# Patient Record
Sex: FEMALE | Race: WHITE | NOT HISPANIC OR LATINO | Employment: OTHER | URBAN - METROPOLITAN AREA
[De-identification: names, ages, dates, MRNs, and addresses within clinical notes are randomized per-mention and may not be internally consistent; named-entity substitution may affect disease eponyms.]

---

## 2019-02-27 RX ORDER — OMEPRAZOLE 40 MG/1
40 CAPSULE, DELAYED RELEASE ORAL AS NEEDED
COMMUNITY

## 2019-02-27 RX ORDER — MEDROXYPROGESTERONE ACETATE 2.5 MG/1
2.5 TABLET ORAL 3 TIMES WEEKLY
COMMUNITY

## 2019-02-27 RX ORDER — ASPIRIN 81 MG/1
81 TABLET ORAL DAILY
COMMUNITY

## 2019-02-28 ENCOUNTER — OFFICE VISIT (OUTPATIENT)
Dept: NEUROSURGERY | Facility: CLINIC | Age: 69
End: 2019-02-28

## 2019-02-28 VITALS
HEIGHT: 68 IN | WEIGHT: 114 LBS | SYSTOLIC BLOOD PRESSURE: 120 MMHG | BODY MASS INDEX: 17.28 KG/M2 | DIASTOLIC BLOOD PRESSURE: 82 MMHG | RESPIRATION RATE: 16 BRPM

## 2019-02-28 DIAGNOSIS — I65.21 STENOSIS OF RIGHT CAROTID ARTERY: Primary | ICD-10-CM

## 2019-02-28 PROCEDURE — 99203 OFFICE O/P NEW LOW 30 MIN: CPT | Performed by: NEUROLOGICAL SURGERY

## 2019-02-28 RX ORDER — CLOPIDOGREL BISULFATE 75 MG/1
75 TABLET ORAL DAILY
Qty: 30 TABLET | Refills: 5 | Status: SHIPPED | OUTPATIENT
Start: 2019-02-28 | End: 2019-09-09

## 2019-02-28 RX ORDER — ATORVASTATIN CALCIUM 20 MG/1
20 TABLET, FILM COATED ORAL DAILY
COMMUNITY
Start: 2019-02-25

## 2019-02-28 NOTE — PROGRESS NOTES
NAME: CYNTHIA BLOCH   DOS: 2019  : 1950  PCP: Emeterio Duran MD    Chief Complaint: Incidental cervical bruit  Chief Complaint   Patient presents with   • Carotid Stenosis       History of Present Illness:  68 y.o. female     This is a 68-year-old female with a family history of paternal heart attack at a young age she denies any strokes and has a mother with asymptomatic high-grade carotid disease.  She is a non-smoker reports no personal history of stroke or TIA and was recently diagnosed with an incidental bruit in the right cervical and underwent duplex sonography she is here for evaluation  PMHX  Allergies:  Allergies   Allergen Reactions   • Diclofenac Palpitations   • Ibuprofen Palpitations   • Sulfa Antibiotics Rash     Medications    Current Outpatient Medications:   •  aspirin 81 MG EC tablet, Take 81 mg by mouth Daily., Disp: , Rfl:   •  atorvastatin (LIPITOR) 40 MG tablet, , Disp: , Rfl:   •  medroxyPROGESTERone (PROVERA) 2.5 MG tablet, Take 2.5 mg by mouth Daily., Disp: , Rfl:   •  omeprazole (priLOSEC) 40 MG capsule, Take 40 mg by mouth Daily., Disp: , Rfl:   Past Medical History:  Past Medical History:   Diagnosis Date   • Cancer (CMS/HCC)     Basal Cell. Removed from upper lip   • Osteoporosis      Past Surgical History:  Past Surgical History:   Procedure Laterality Date   • SKIN CANCER EXCISION      Upper lip     Social Hx:  Social History     Tobacco Use   • Smoking status: Never Smoker   • Smokeless tobacco: Never Used   Substance Use Topics   • Alcohol use: Yes   • Drug use: No     Family Hx:  Family History   Problem Relation Age of Onset   • Hypertension Mother    • Heart disease Father      Review of Systems:        Review of Systems   Constitutional: Negative for activity change, appetite change, chills, diaphoresis, fatigue, fever and unexpected weight change.   HENT: Negative for congestion, dental problem, drooling, ear discharge, ear pain, facial swelling, hearing loss,  mouth sores, nosebleeds, postnasal drip, rhinorrhea, sinus pressure, sneezing, sore throat, tinnitus, trouble swallowing and voice change.    Eyes: Negative for photophobia, pain, discharge, redness, itching and visual disturbance.   Respiratory: Negative for apnea, cough, choking, chest tightness, shortness of breath, wheezing and stridor.    Cardiovascular: Negative for chest pain, palpitations and leg swelling.   Gastrointestinal: Negative for abdominal distention, abdominal pain, anal bleeding, blood in stool, constipation, diarrhea, nausea, rectal pain and vomiting.   Endocrine: Negative for cold intolerance, heat intolerance, polydipsia, polyphagia and polyuria.   Genitourinary: Negative for decreased urine volume, difficulty urinating, dysuria, enuresis, flank pain, frequency, genital sores, hematuria and urgency.   Musculoskeletal: Negative for arthralgias, back pain, gait problem, joint swelling, myalgias, neck pain and neck stiffness.   Skin: Negative for color change, pallor, rash and wound.   Allergic/Immunologic: Negative for environmental allergies, food allergies and immunocompromised state.   Neurological: Negative for dizziness, tremors, seizures, syncope, facial asymmetry, speech difficulty, weakness, light-headedness, numbness and headaches.   Hematological: Negative for adenopathy. Does not bruise/bleed easily.   Psychiatric/Behavioral: Negative for agitation, behavioral problems, confusion, decreased concentration, dysphoric mood, hallucinations, self-injury, sleep disturbance and suicidal ideas. The patient is not nervous/anxious and is not hyperactive.    All other systems reviewed and are negative.     I have reviewed this note template and all pertinent parts of the review of systems social, family history, surgical history and medication list      Physical Examination:  Vitals:    02/28/19 1137   BP: 120/82   Resp: 16      General Appearance:   Well developed, well nourished, well groomed,  alert, and cooperative.  Neurological examination:  Neurologic Exam  Vital signs were reviewed and documented in the chart  Patient appeared in good neurologic function with normal comprehension fluent speech  Mood and affect are normal  Sense of smell deferred    Pupils symmetric equally reactive funduscopic exam not visualized   Visual fields intact to confrontation  Extraocular movements intact  Face motor function is symmetric  Facial sensations normal  Hearing intact to finger rub hearing intact to finger rub  Tongue is midline  Palate symmetric  Swallowing normal  Shoulder shrug normal  Her neck is supple with full range of motion she has a subtle bruit of the right cervical area  Muscle bulk and tone normal  5 out of 5 strength no motor drift  Gait normal intact  Negative Romberg  No clonus long tract signs or myelopathy    Reflexes symmetric  No edema noted and extremities skin appears normal  Arms are well perfused skin is normal for age      Review of Imaging/DATA:  Carotid ultrasounds demonstrate elevated velocities she is relatively significant on the right side it shows the presence of peak systolic velocities of 300/90 or so consistent with higher grade stenosis of greater than 75 if not 80% the left side is also elevated there is poor waveform in the vertebrals  Diagnoses/Plan:    Ms. Bloch is a 68 y.o. female   I agree with Dr. Grimes assessment for the treatment with Lipitor I would also add dual antiplatelet therapy as it is likely that she is to require intervention given the findings on ultrasonography.  This will also allow us a period of time to check for tolerance.  We will get a CTA of the head neck having spleen the risk benefits and outcome from this and then we can discuss Crest trial, endarterectomy, and/or stenting.  I spent 30 minutes with her explaining the risk benefits

## 2019-03-04 ENCOUNTER — OFFICE VISIT (OUTPATIENT)
Dept: NEUROSURGERY | Facility: CLINIC | Age: 69
End: 2019-03-04

## 2019-03-04 ENCOUNTER — DOCUMENTATION (OUTPATIENT)
Dept: NEUROSURGERY | Facility: CLINIC | Age: 69
End: 2019-03-04

## 2019-03-04 ENCOUNTER — HOSPITAL ENCOUNTER (OUTPATIENT)
Dept: CT IMAGING | Facility: HOSPITAL | Age: 69
Discharge: HOME | End: 2019-03-04

## 2019-03-04 ENCOUNTER — HOSPITAL ENCOUNTER (OUTPATIENT)
Dept: CARDIOLOGY | Facility: HOSPITAL | Age: 69
Discharge: HOME | End: 2019-03-04

## 2019-03-04 ENCOUNTER — HOSPITAL ENCOUNTER (OUTPATIENT)
Dept: CT IMAGING | Facility: HOSPITAL | Age: 69
Discharge: HOME | End: 2019-03-04
Admitting: NEUROLOGICAL SURGERY

## 2019-03-04 VITALS — HEIGHT: 68 IN | WEIGHT: 114 LBS | BODY MASS INDEX: 17.28 KG/M2

## 2019-03-04 VITALS — BODY MASS INDEX: 17.28 KG/M2 | RESPIRATION RATE: 17 BRPM | TEMPERATURE: 98.6 F | WEIGHT: 114 LBS | HEIGHT: 68 IN

## 2019-03-04 DIAGNOSIS — I65.21 STENOSIS OF RIGHT CAROTID ARTERY: ICD-10-CM

## 2019-03-04 DIAGNOSIS — I65.21 STENOSIS OF RIGHT CAROTID ARTERY: Primary | ICD-10-CM

## 2019-03-04 LAB
BH CV XLRA MEAS LEFT DIST CCA EDV: 28 CM/SEC
BH CV XLRA MEAS LEFT DIST CCA PSV: 95 CM/SEC
BH CV XLRA MEAS LEFT DIST ICA EDV: 38 CM/SEC
BH CV XLRA MEAS LEFT DIST ICA PSV: 99 CM/SEC
BH CV XLRA MEAS LEFT ICA/CCA RATIO: 2
BH CV XLRA MEAS LEFT MID CCA EDV: 27 CM/SEC
BH CV XLRA MEAS LEFT MID CCA PSV: 96 CM/SEC
BH CV XLRA MEAS LEFT MID ICA EDV: 51 CM/SEC
BH CV XLRA MEAS LEFT MID ICA PSV: 192 CM/SEC
BH CV XLRA MEAS LEFT PROX CCA EDV: 24 CM/SEC
BH CV XLRA MEAS LEFT PROX CCA PSV: 82 CM/SEC
BH CV XLRA MEAS LEFT PROX ECA EDV: 12 CM/SEC
BH CV XLRA MEAS LEFT PROX ECA PSV: 71 CM/SEC
BH CV XLRA MEAS LEFT PROX ICA EDV: 19 CM/SEC
BH CV XLRA MEAS LEFT PROX ICA PSV: 69 CM/SEC
BH CV XLRA MEAS LEFT PROX SCLA EDV: 0 CM/SEC
BH CV XLRA MEAS LEFT PROX SCLA PSV: 121 CM/SEC
BH CV XLRA MEAS LEFT VERTEBRAL A EDV: 23 CM/SEC
BH CV XLRA MEAS LEFT VERTEBRAL A PSV: 69 CM/SEC
BH CV XLRA MEAS RIGHT DIST CCA EDV: 32 CM/SEC
BH CV XLRA MEAS RIGHT DIST CCA PSV: 79 CM/SEC
BH CV XLRA MEAS RIGHT DIST ICA EDV: 73 CM/SEC
BH CV XLRA MEAS RIGHT DIST ICA PSV: 240 CM/SEC
BH CV XLRA MEAS RIGHT ICA/CCA RATIO: 1.3
BH CV XLRA MEAS RIGHT MID CCA EDV: 30 CM/SEC
BH CV XLRA MEAS RIGHT MID CCA PSV: 93 CM/SEC
BH CV XLRA MEAS RIGHT MID ICA EDV: 41 CM/SEC
BH CV XLRA MEAS RIGHT MID ICA PSV: 105 CM/SEC
BH CV XLRA MEAS RIGHT PROX CCA EDV: 24 CM/SEC
BH CV XLRA MEAS RIGHT PROX CCA PSV: 83 CM/SEC
BH CV XLRA MEAS RIGHT PROX ECA EDV: 11 CM/SEC
BH CV XLRA MEAS RIGHT PROX ECA PSV: 84 CM/SEC
BH CV XLRA MEAS RIGHT PROX ICA EDV: 22 CM/SEC
BH CV XLRA MEAS RIGHT PROX ICA PSV: 66 CM/SEC
BH CV XLRA MEAS RIGHT PROX SCLA PSV: 163 CM/SEC
BH CV XLRA MEAS RIGHT VERTEBRAL A EDV: 25 CM/SEC
BH CV XLRA MEAS RIGHT VERTEBRAL A PSV: 108 CM/SEC
CREAT BLDA-MCNC: 0.8 MG/DL (ref 0.6–1.3)
LEFT ARM BP: NORMAL MMHG
RIGHT ARM BP: NORMAL MMHG

## 2019-03-04 PROCEDURE — 0 IOPAMIDOL PER 1 ML: Performed by: NEUROLOGICAL SURGERY

## 2019-03-04 PROCEDURE — 93880 EXTRACRANIAL BILAT STUDY: CPT

## 2019-03-04 PROCEDURE — 93880 EXTRACRANIAL BILAT STUDY: CPT | Performed by: INTERNAL MEDICINE

## 2019-03-04 PROCEDURE — 70498 CT ANGIOGRAPHY NECK: CPT

## 2019-03-04 PROCEDURE — 70496 CT ANGIOGRAPHY HEAD: CPT

## 2019-03-04 PROCEDURE — 99213 OFFICE O/P EST LOW 20 MIN: CPT | Performed by: NEUROLOGICAL SURGERY

## 2019-03-04 PROCEDURE — 82565 ASSAY OF CREATININE: CPT

## 2019-03-04 RX ADMIN — IOPAMIDOL 95 ML: 755 INJECTION, SOLUTION INTRAVENOUS at 09:49

## 2019-03-04 NOTE — PROGRESS NOTES
Dr. Goodson reviewed Carotid Ultrasound.   Right ICA is better today, between 50-75% stenosis, compared to outside studies.     Pt is aware, and she will contact our office in 5 months to schedule repeat carotid ultrasound.

## 2019-03-04 NOTE — PROGRESS NOTES
NAME: CYNTHIA A BLOCH   DOS: 3/4/2019  : 1950  PCP: Emeterio Duran MD    Chief Complaint:    Chief Complaint   Patient presents with   • Carotid Stenosis     F/u CTA x2       History of Present Illness:  68 y.o. female   Follow-up carotid angiogram CTA    PMHX  Allergies:  Allergies   Allergen Reactions   • Diclofenac Palpitations   • Ibuprofen Palpitations   • Sulfa Antibiotics Rash     Medications    Current Outpatient Medications:   •  aspirin 81 MG EC tablet, Take 81 mg by mouth Daily., Disp: , Rfl:   •  atorvastatin (LIPITOR) 40 MG tablet, , Disp: , Rfl:   •  clopidogrel (PLAVIX) 75 MG tablet, Take 1 tablet by mouth Daily., Disp: 30 tablet, Rfl: 5  •  medroxyPROGESTERone (PROVERA) 2.5 MG tablet, Take 2.5 mg by mouth Daily., Disp: , Rfl:   •  omeprazole (priLOSEC) 40 MG capsule, Take 40 mg by mouth Daily., Disp: , Rfl:   No current facility-administered medications for this visit.   Past Medical History:  Past Medical History:   Diagnosis Date   • Cancer (CMS/HCC)     Basal Cell. Removed from upper lip   • Osteoporosis      Past Surgical History:  Past Surgical History:   Procedure Laterality Date   • SKIN CANCER EXCISION      Upper lip     Social Hx:  Social History     Tobacco Use   • Smoking status: Never Smoker   • Smokeless tobacco: Never Used   Substance Use Topics   • Alcohol use: Yes   • Drug use: No     Family Hx:  Family History   Problem Relation Age of Onset   • Hypertension Mother    • Heart disease Father      Review of Systems:        Review of Systems   Constitutional: Negative for activity change, appetite change, chills, diaphoresis, fatigue, fever and unexpected weight change.   HENT: Negative for congestion, dental problem, drooling, ear discharge, ear pain, facial swelling, hearing loss, mouth sores, nosebleeds, postnasal drip, rhinorrhea, sinus pressure, sneezing, sore throat, tinnitus, trouble swallowing and voice change.    Eyes: Negative for photophobia, pain, discharge,  redness, itching and visual disturbance.   Respiratory: Negative for apnea, cough, choking, chest tightness, shortness of breath, wheezing and stridor.    Cardiovascular: Negative for chest pain, palpitations and leg swelling.   Gastrointestinal: Negative for abdominal distention, abdominal pain, anal bleeding, blood in stool, constipation, diarrhea, nausea, rectal pain and vomiting.   Endocrine: Negative for cold intolerance, heat intolerance, polydipsia, polyphagia and polyuria.   Genitourinary: Negative for decreased urine volume, difficulty urinating, dysuria, enuresis, flank pain, frequency, genital sores, hematuria and urgency.   Musculoskeletal: Negative for arthralgias, back pain, gait problem, joint swelling, myalgias, neck pain and neck stiffness.   Skin: Negative for color change, pallor, rash and wound.   Allergic/Immunologic: Negative for environmental allergies, food allergies and immunocompromised state.   Neurological: Negative for dizziness, tremors, seizures, syncope, facial asymmetry, speech difficulty, weakness, light-headedness, numbness and headaches.   Hematological: Negative for adenopathy. Does not bruise/bleed easily.   Psychiatric/Behavioral: Negative for agitation, behavioral problems, confusion, decreased concentration, dysphoric mood, hallucinations, self-injury, sleep disturbance and suicidal ideas. The patient is not nervous/anxious and is not hyperactive.    All other systems reviewed and are negative.     No TIA or stroke symptoms      Physical Examination:  Vitals:    03/04/19 1208   Resp: 17   Temp: 98.6 °F (37 °C)      General Appearance:   Well developed, well nourished, well groomed, alert, and cooperative.  Neurological examination:  Neurologic Exam  No evidence of bruits at the renal area no evidence of cranial bruits  Awake alert follows commands normal gait  Mild right carotid bruit noted    Review of Imaging/DATA:  CTA interestingly shows no evidence of obstructive flow at  the level of the mid cervical carotid there may be changes of fibromuscular dysplasia I see no surgical lesions  Diagnoses/Plan:    Ms. Bloch is a 68 y.o. female   Then, this is an interesting case.  This is a lady with a history of elevated velocities of repeated an ultrasound is pending.  I think she may have fibromuscular dysplasia her mother may have suffered the same.  I think it is okay to DC her Plavix I would continue her on a 20 mg dose of Lipitor given her cardiovascular risk factors.  I discussed with her the risk benefits and trade-off of doing a diagnostic angiogram I do not think this is going to change her treatment at the present time.  I am going to follow her with noninvasive imaging and will call her with the results of her ultrasound.  I will see her back in 6 months with a repeat duplex of the neck if the velocities elevate we will contemplate a diagnostic catheter angiogram.

## 2019-07-17 ENCOUNTER — TELEPHONE (OUTPATIENT)
Dept: NEUROSURGERY | Facility: CLINIC | Age: 69
End: 2019-07-17

## 2019-07-17 DIAGNOSIS — I65.21 STENOSIS OF RIGHT CAROTID ARTERY: Primary | ICD-10-CM

## 2019-09-09 ENCOUNTER — TELEPHONE (OUTPATIENT)
Dept: NEUROSURGERY | Facility: CLINIC | Age: 69
End: 2019-09-09

## 2019-09-09 ENCOUNTER — OFFICE VISIT (OUTPATIENT)
Dept: NEUROSURGERY | Facility: CLINIC | Age: 69
End: 2019-09-09

## 2019-09-09 ENCOUNTER — HOSPITAL ENCOUNTER (OUTPATIENT)
Dept: CARDIOLOGY | Facility: HOSPITAL | Age: 69
Discharge: HOME | End: 2019-09-09
Admitting: NEUROLOGICAL SURGERY

## 2019-09-09 VITALS
WEIGHT: 112 LBS | HEIGHT: 67 IN | RESPIRATION RATE: 14 BRPM | BODY MASS INDEX: 17.58 KG/M2 | SYSTOLIC BLOOD PRESSURE: 141 MMHG | DIASTOLIC BLOOD PRESSURE: 78 MMHG

## 2019-09-09 VITALS — BODY MASS INDEX: 17.89 KG/M2 | HEIGHT: 67 IN | WEIGHT: 113.98 LBS

## 2019-09-09 DIAGNOSIS — I65.21 STENOSIS OF RIGHT CAROTID ARTERY: Primary | ICD-10-CM

## 2019-09-09 DIAGNOSIS — I77.3 FIBROMUSCULAR DYSPLASIA (HCC): Primary | ICD-10-CM

## 2019-09-09 LAB
BH CV XLRA MEAS LEFT CCA RATIO VEL: 90.1 CM/SEC
BH CV XLRA MEAS LEFT DIST CCA EDV: 25.1 CM/SEC
BH CV XLRA MEAS LEFT DIST CCA PSV: 79.6 CM/SEC
BH CV XLRA MEAS LEFT DIST ICA EDV: 39 CM/SEC
BH CV XLRA MEAS LEFT DIST ICA PSV: 113.1 CM/SEC
BH CV XLRA MEAS LEFT ICA RATIO VEL: 227 CM/SEC
BH CV XLRA MEAS LEFT ICA/CCA RATIO: 2.5
BH CV XLRA MEAS LEFT MID CCA EDV: 30.7 CM/SEC
BH CV XLRA MEAS LEFT MID CCA PSV: 90.8 CM/SEC
BH CV XLRA MEAS LEFT MID ICA EDV: 69.8 CM/SEC
BH CV XLRA MEAS LEFT MID ICA PSV: 227 CM/SEC
BH CV XLRA MEAS LEFT PROX CCA EDV: 28.6 CM/SEC
BH CV XLRA MEAS LEFT PROX CCA PSV: 85.9 CM/SEC
BH CV XLRA MEAS LEFT PROX ECA EDV: 12.2 CM/SEC
BH CV XLRA MEAS LEFT PROX ECA PSV: 69.8 CM/SEC
BH CV XLRA MEAS LEFT PROX ICA EDV: 21.2 CM/SEC
BH CV XLRA MEAS LEFT PROX ICA PSV: 62.1 CM/SEC
BH CV XLRA MEAS LEFT PROX SCLA PSV: 122 CM/SEC
BH CV XLRA MEAS LEFT VERTEBRAL A EDV: 20.1 CM/SEC
BH CV XLRA MEAS LEFT VERTEBRAL A PSV: 53.5 CM/SEC
BH CV XLRA MEAS RIGHT CCA RATIO VEL: 91.8 CM/SEC
BH CV XLRA MEAS RIGHT DIST CCA EDV: 27.7 CM/SEC
BH CV XLRA MEAS RIGHT DIST CCA PSV: 81.1 CM/SEC
BH CV XLRA MEAS RIGHT DIST ICA EDV: 74.4 CM/SEC
BH CV XLRA MEAS RIGHT DIST ICA PSV: 232 CM/SEC
BH CV XLRA MEAS RIGHT ICA RATIO VEL: 232 CM/SEC
BH CV XLRA MEAS RIGHT ICA/CCA RATIO: 2.5
BH CV XLRA MEAS RIGHT MID CCA EDV: 29.5 CM/SEC
BH CV XLRA MEAS RIGHT MID CCA PSV: 92.4 CM/SEC
BH CV XLRA MEAS RIGHT MID ICA EDV: 59.7 CM/SEC
BH CV XLRA MEAS RIGHT MID ICA PSV: 130.4 CM/SEC
BH CV XLRA MEAS RIGHT PROX CCA EDV: 30.8 CM/SEC
BH CV XLRA MEAS RIGHT PROX CCA PSV: 93.7 CM/SEC
BH CV XLRA MEAS RIGHT PROX ECA EDV: 9.8 CM/SEC
BH CV XLRA MEAS RIGHT PROX ECA PSV: 68 CM/SEC
BH CV XLRA MEAS RIGHT PROX ICA EDV: 20.4 CM/SEC
BH CV XLRA MEAS RIGHT PROX ICA PSV: 53.4 CM/SEC
BH CV XLRA MEAS RIGHT PROX SCLA EDV: 10.7 CM/SEC
BH CV XLRA MEAS RIGHT PROX SCLA PSV: 106.2 CM/SEC
BH CV XLRA MEAS RIGHT VERTEBRAL A EDV: 42.4 CM/SEC
BH CV XLRA MEAS RIGHT VERTEBRAL A PSV: 127.3 CM/SEC
LEFT ARM BP: NORMAL MMHG
RIGHT ARM BP: NORMAL MMHG

## 2019-09-09 PROCEDURE — 99213 OFFICE O/P EST LOW 20 MIN: CPT | Performed by: NEUROLOGICAL SURGERY

## 2019-09-09 PROCEDURE — 93880 EXTRACRANIAL BILAT STUDY: CPT

## 2019-09-09 PROCEDURE — 93880 EXTRACRANIAL BILAT STUDY: CPT | Performed by: INTERNAL MEDICINE

## 2019-09-09 NOTE — PROGRESS NOTES
NAME: CYNTHIA ANN BLOCH   DOS: 2019  : 1950  PCP: Emeterio Duran MD    Chief Complaint:    Chief Complaint   Patient presents with   • Carotid Stenosis     Routine 5 month f/u duplex       History of Present Illness:  69 y.o. female   Is a 69-year-old with a history of incidentally noted carotid bruits.  She is very interesting she is had a CTA that was unremarkable probably showed changes of fibromuscular dysplasia worse on the left than the right and all that were obtained secondary to elevated velocities on duplex sonography.    She is here for follow-up she denies any temporal arteritis signs such as temporal headache visual blurriness she is up-to-date on ophthalmologic exams.  She denies any weakness numbness tingling    She carries a strong family history for this with her mother    PMHX  Allergies:  Allergies   Allergen Reactions   • Diclofenac Palpitations   • Ibuprofen Palpitations   • Sulfa Antibiotics Rash     Medications    Current Outpatient Medications:   •  aspirin 81 MG EC tablet, Take 81 mg by mouth Daily., Disp: , Rfl:   •  atorvastatin (LIPITOR) 20 MG tablet, 20 mg., Disp: , Rfl:   •  medroxyPROGESTERone (PROVERA) 2.5 MG tablet, Take 2.5 mg by mouth Daily., Disp: , Rfl:   •  omeprazole (priLOSEC) 40 MG capsule, Take 40 mg by mouth Daily., Disp: , Rfl:   Past Medical History:  Past Medical History:   Diagnosis Date   • Cancer (CMS/HCC)     (Basal cell) --Removed from upper lip   • Osteoporosis      Past Surgical History:  Past Surgical History:   Procedure Laterality Date   • SKIN CANCER EXCISION      Upper lip     Social Hx:  Social History     Tobacco Use   • Smoking status: Never Smoker   • Smokeless tobacco: Never Used   Substance Use Topics   • Alcohol use: Yes   • Drug use: No     Family Hx:  Family History   Problem Relation Age of Onset   • Hypertension Mother    • Heart disease Father      Review of Systems:        Review of Systems   Constitutional: Negative for activity  change, appetite change, chills, diaphoresis, fatigue, fever and unexpected weight change.   HENT: Negative for congestion, dental problem, drooling, ear discharge, ear pain, facial swelling, hearing loss, mouth sores, nosebleeds, postnasal drip, rhinorrhea, sinus pressure, sneezing, sore throat, tinnitus, trouble swallowing and voice change.    Eyes: Negative for photophobia, pain, discharge, redness, itching and visual disturbance.   Respiratory: Negative for apnea, cough, choking, chest tightness, shortness of breath, wheezing and stridor.    Cardiovascular: Negative for chest pain, palpitations and leg swelling.   Gastrointestinal: Negative for abdominal distention, abdominal pain, anal bleeding, blood in stool, constipation, diarrhea, nausea, rectal pain and vomiting.   Endocrine: Negative for cold intolerance, heat intolerance, polydipsia, polyphagia and polyuria.   Genitourinary: Negative for decreased urine volume, difficulty urinating, dysuria, enuresis, flank pain, frequency, genital sores, hematuria and urgency.   Musculoskeletal: Negative for arthralgias, back pain, gait problem, joint swelling, myalgias, neck pain and neck stiffness.   Skin: Negative for color change, pallor, rash and wound.   Allergic/Immunologic: Negative for environmental allergies, food allergies and immunocompromised state.   Neurological: Negative for dizziness, tremors, seizures, syncope, facial asymmetry, speech difficulty, weakness, light-headedness, numbness and headaches.   Hematological: Negative for adenopathy. Does not bruise/bleed easily.   Psychiatric/Behavioral: Negative for agitation, behavioral problems, confusion, decreased concentration, dysphoric mood, hallucinations, self-injury, sleep disturbance and suicidal ideas. The patient is not nervous/anxious and is not hyperactive.    All other systems reviewed and are negative.       I have reviewed this note template and all pertinent parts of the review of systems  social, family history, surgical history and medication list    Physical Examination:  Vitals:    09/09/19 0924   BP: 141/78   Resp: 14      General Appearance:   Well developed, well nourished, well groomed, alert, and cooperative.  Neurological examination:  Neurologic Exam  Vital signs were reviewed and documented in the chart  Patient appeared in good neurologic function with normal comprehension fluent speech  Mood and affect are normal  Sense of smell deferred    Pupils symmetric equally reactive funduscopic exam not visualized   Visual fields intact to confrontation  Extraocular movements intact  Face motor function is symmetric  Facial sensations normal  Hearing intact to finger rub hearing intact to finger rub  Tongue is midline  Palate symmetric  Swallowing normal  Shoulder shrug normal  Probable very subtle bruit bilateral    No bruits at her renal area  Muscle bulk and tone normal  5 out of 5 strength no motor drift  Gait normal intact  Negative Romberg  No clonus long tract signs or myelopathy  Reflexes symmetric  She has strong STA pulses bilateral   Review of Imaging/DATA:  I saw and reviewed her duplex is old and new her CTAs head and neck  Diagnoses/Plan:    Ms. Bloch is a 69 y.o. female   I suspect that she that she has fibromuscular dysplasia.  She has elevated velocities today when compared with her old studies are mostly stable she is 240/73 with a ratio of 1.3 on the right 192/51 with a ratio of 2 on the left that was from 319 today she is 232/74 with a ratio of 2.5 on the right and 227/64 with a ratio 2.5 on the left.    She has no symptomatology to suggest any progressive neurologic issues.  I do think it is reasonable to send her to a vascular surgeon to do an abdominal ultrasounds as well as renal ultrasounds and I will set that up I think that I can follow her up in 1 years time.    I spent 30 minutes counseling her about signs and symptoms to look for ongoing ophthalmologic observations.   Even discussed the possibility of doing a diagnostic angiogram as well as an STA by biopsy if  symptoms developed.  She is to continue aspirin and statin.

## 2019-10-14 ENCOUNTER — TELEPHONE (OUTPATIENT)
Dept: NEUROSURGERY | Facility: CLINIC | Age: 69
End: 2019-10-14

## 2019-10-14 DIAGNOSIS — I70.1 RENAL ARTERY STENOSIS (HCC): Primary | ICD-10-CM

## 2019-10-14 NOTE — TELEPHONE ENCOUNTER
Provider:  Hamzah   Caller: ashly orellana  Time of call:   10:11  Phone #:  [phone]  Surgery:  Na   Surgery Date:  Na   Last visit:   9/9/2019  Next visit: joaquin ELMORE:         Reason for call:         Regarding: Test Results Question  Contact: [phone]  ----- Message from Mychart, Generic sent at 10/14/2019 10:11 AM EDT -----    I was at [facility] (downstairs from your office) on Oct. 2nd for a test that Dr. Goodson requested.  An ultrasound was done on my kidneys.  I have 2 questions:  1. I have not yet heard my results.  Can you tell me what they are?  2.  Dr. Goodson had requested 2 different ultrasounds, but the technician couldn't tell what the 2nd request was for.  What additional test am I supposed to do, and who is scheduling it?  Thank you.  Lucy

## 2019-10-14 NOTE — TELEPHONE ENCOUNTER
Pt needs abdominal US as well, I believe pt was having issues with abd discomfort at visit?     Can we please get this scheduled, we also need report faxed to office.     Please let pt know we are working on this.

## 2019-10-14 NOTE — TELEPHONE ENCOUNTER
Patient notified to call Dr. Jackman's office.    She wants to know the answer to question number 2.  Did Hamzah order something else? Has the tech confused her?  Not sure how to answer her question.

## 2019-10-14 NOTE — TELEPHONE ENCOUNTER
Patient will need to follow-up with Dr. Jackman for the results of the studies.  She was to have a renal and abdominal ultrasound

## 2019-10-15 ENCOUNTER — PATIENT MESSAGE (OUTPATIENT)
Dept: NEUROSURGERY | Facility: CLINIC | Age: 69
End: 2019-10-15

## 2019-10-16 NOTE — TELEPHONE ENCOUNTER
Left a message for Eduin, Dr Craig [employer] to return my call.     Eduin returned my called at 1522 regarding the Abdomen US. He is unsure what is exactly needed. Does he need to do mesentric arteries? They want to make sure they do the correct US. Also they could not complete both US on the same day, as the ins will only pay for one per visit (per Eduin).    If the order is correct, I will call to schedule.

## 2019-10-17 NOTE — TELEPHONE ENCOUNTER
We will call with results. -Please schedule the abdominal US.      Dr. Goodson spoke with pt VIA telephone in regard to renal US results.

## 2019-10-17 NOTE — TELEPHONE ENCOUNTER
Spoke with Dr. Goodson-   He wants abdominal US to rule out aortic aneurysm, pt has a family history of this.     Pt's renal US is as expected, pt has renal artery stenosis. Dr. Goodson wants pt to see Cardiologist to follow this. Nothing surgical to do. If pt wants to speak with me I'm happy to call her.

## 2019-10-30 ENCOUNTER — CONSULT (OUTPATIENT)
Dept: CARDIOLOGY | Facility: CLINIC | Age: 69
End: 2019-10-30

## 2019-10-30 VITALS
WEIGHT: 115 LBS | HEART RATE: 83 BPM | SYSTOLIC BLOOD PRESSURE: 110 MMHG | BODY MASS INDEX: 18.48 KG/M2 | HEIGHT: 66 IN | DIASTOLIC BLOOD PRESSURE: 78 MMHG

## 2019-10-30 DIAGNOSIS — I70.1 RENAL ARTERY STENOSIS (HCC): Primary | ICD-10-CM

## 2019-10-30 DIAGNOSIS — I44.7 LBBB (LEFT BUNDLE BRANCH BLOCK): ICD-10-CM

## 2019-10-30 DIAGNOSIS — R06.02 SHORTNESS OF BREATH: ICD-10-CM

## 2019-10-30 PROCEDURE — 93000 ELECTROCARDIOGRAM COMPLETE: CPT | Performed by: INTERNAL MEDICINE

## 2019-10-30 PROCEDURE — 99204 OFFICE O/P NEW MOD 45 MIN: CPT | Performed by: INTERNAL MEDICINE

## 2019-10-30 RX ORDER — ESTRADIOL 0.1 MG/G
1 CREAM VAGINAL WEEKLY
COMMUNITY

## 2019-10-30 RX ORDER — PHENOL 1.4 %
1200 AEROSOL, SPRAY (ML) MUCOUS MEMBRANE DAILY
COMMUNITY

## 2019-10-30 NOTE — PROGRESS NOTES
[facility] at [facility]  Consultation H&P  Cynthia Ann Bloch  1950    There is no work phone number on file..    VISIT DATE:  10/30/2019    PCP: Emeterio Duran MD  [address]  [facility] [address]    CC:  Chief Complaint   Patient presents with   • Renal Artery Stenosis     Consult        ASSESSMENT:   Diagnosis Plan   1. Renal artery stenosis (CMS/HCC)  MRI Angiogram Abdomen With Contrast   2. LBBB (left bundle branch block)     3. Shortness of breath  Adult Transthoracic Echo Complete W/ Cont if Necessary Per Protocol     Carotid imaging and bilateral renal duplex imaging consistent with fibromuscular dysplasia.  Currently asymptomatic with preserved renal function, no resistant hypertension and normal renal size.    PLAN:  -MRA abdomen to more definitively evaluate bilateral distal renal artery stenosis secondary to fibromuscular dysplasia.  -Will be able to follow fibromuscular dysplasia involvement of the renal arteries annually with duplex imaging to assess renal artery velocities/renal size, and biannual laboratory evaluation to follow renal function.  -Echo pending to assess underlying myocardial structure and function in the setting of left bundle branch block.    History of Present Illness   69-year-old female who underwent carotid imaging after bilateral carotid bruits were uncovered on routine exam.  Serial carotid duplex imaging and CTA most consistent with fibromuscular dysplasia.  Bilateral renal artery duplex imaging also most consistent with fibromuscular dysplasia involving the distal renal arteries bilaterally.  Normal renal size.  Normal renal function.  Blood pressures running less than 130/80 mmHg.  She has no TIA or strokelike symptoms.  She has a known history of a chronic left bundle branch block.  Denies chest discomfort or palpitations.  Mild shortness of breath and a mild class II pattern.  She appears to be a very fit and active  "individual.    PHYSICAL EXAMINATION:  Vitals:    10/30/19 1259   BP: 110/78   BP Location: Right arm   Patient Position: Sitting   Pulse: 83   Weight: 52.2 kg (115 lb)   Height: 167.6 cm (66\")     General Appearance:    Alert, cooperative, no distress, appears stated age   Head:    Normocephalic, without obvious abnormality, atraumatic   Eyes:    conjunctiva/corneas clear, EOM's intact, fundi     benign, both eyes   Ears:    Normal TM's and external ear canals, both ears   Nose:   Nares normal, septum midline, mucosa normal, no drainage    or sinus tenderness   Throat:   Lips, mucosa, and tongue normal; teeth and gums normal   Neck:   Supple, symmetrical, trachea midline, no adenopathy;     thyroid:  no enlargement/tenderness/nodules; no carotid    bruit or JVD   Back:     Symmetric, no curvature, ROM normal, no CVA tenderness   Lungs:     Clear to auscultation bilaterally, respirations unlabored   Chest Wall:    No tenderness or deformity    Heart:    Regular rate and rhythm, S1 and S2 normal, no murmur, rub   or gallop, bilateral carotid bruit, left greater than right.   Abdomen:     Soft, non-tender, bowel sounds active all four quadrants,     no masses, no organomegaly   Extremities:   Extremities normal, atraumatic, no cyanosis or edema   Pulses:   2+ and symmetric all extremities   Skin:   Skin color, texture, turgor normal, no rashes or lesions   Lymph nodes:   Cervical, supraclavicular, and axillary nodes normal   Neurologic:   normal strength, sensation intact     throughout       Diagnostic Data:    ECG 12 Lead  Date/Time: 10/30/2019 1:03 PM  Performed by: Hernán Allen III, MD  Authorized by: Hernán Allen III, MD   Previous ECG: no previous ECG available  Rhythm: sinus rhythm  Conduction: left bundle branch block    Clinical impression: abnormal EKG          No results found for: CHLPL, TRIG, HDL, LDLDIRECT  Lab Results   Component Value Date    CREATININE 0.80 03/04/2019     No results found for: " HGBA1C  No results found for: WBC, HGB, HCT, PLT    PROBLEM LIST:  Patient Active Problem List   Diagnosis   • Renal artery stenosis (CMS/HCC)   • LBBB (left bundle branch block)       PAST MEDICAL HX  Past Medical History:   Diagnosis Date   • Cancer (CMS/HCC)     (Basal cell) --Removed from upper lip   • Osteoporosis        Allergies  Allergies   Allergen Reactions   • Diclofenac Palpitations   • Ibuprofen Palpitations   • Sulfa Antibiotics Rash       Current Medications    Current Outpatient Medications:   •  aspirin 81 MG EC tablet, Take 81 mg by mouth Daily., Disp: , Rfl:   •  atorvastatin (LIPITOR) 20 MG tablet, 20 mg Daily., Disp: , Rfl:   •  calcium carbonate (OS-KADIE) 600 MG tablet, Take 1,200 mg by mouth Daily., Disp: , Rfl:   •  Cholecalciferol (VITAMIN D-3 PO), Take 1,000 mg by mouth Daily., Disp: , Rfl:   •  estradiol (ESTRACE) 0.1 MG/GM vaginal cream, Insert 1 g into the vagina 1 (One) Time Per Week., Disp: , Rfl:   •  medroxyPROGESTERone (PROVERA) 2.5 MG tablet, Take 2.5 mg by mouth 3 (Three) Times a Week., Disp: , Rfl:   •  Multiple Vitamins-Minerals (MULTIVITAMIN ADULT PO), Take  by mouth Daily., Disp: , Rfl:   •  omeprazole (priLOSEC) 40 MG capsule, Take 40 mg by mouth As Needed., Disp: , Rfl:   •  Psyllium (METAMUCIL PO), Take  by mouth Daily., Disp: , Rfl:          ROS  Review of Systems   Gastrointestinal: Positive for heartburn.       All other body systems reviewed and are negative    SOCIAL HX  Social History     Socioeconomic History   • Marital status:      Spouse name: Not on file   • Number of children: Not on file   • Years of education: Not on file   • Highest education level: Not on file   Tobacco Use   • Smoking status: Never Smoker   • Smokeless tobacco: Never Used   Substance and Sexual Activity   • Alcohol use: Yes   • Drug use: No   • Sexual activity: Defer       FAMILY HX  Family History   Problem Relation Age of Onset   • Hypertension Mother    • Heart disease Father               Hernán Allen III, MD, FACC

## 2019-10-31 ENCOUNTER — TELEPHONE (OUTPATIENT)
Dept: NEUROSURGERY | Facility: CLINIC | Age: 69
End: 2019-10-31

## 2019-10-31 ENCOUNTER — PATIENT MESSAGE (OUTPATIENT)
Dept: NEUROSURGERY | Facility: CLINIC | Age: 69
End: 2019-10-31

## 2019-10-31 NOTE — TELEPHONE ENCOUNTER
Provider:  Hamzah   Caller: ashly orellana   Time of call:   10:43  Phone #:  [phone]  Surgery:  na  Surgery Date:  na  Last visit:   9/9/2019  Next visit: joaquin ELMORE:         Reason for call:         Regarding: Test Results Question  Contact: [phone]  ----- Message from Mychart, Generic sent at 10/31/2019 10:43 AM EDT -----    Are  there any results yet from my abdominal ultrasound done Mon., Oct. 21?  Also, will the results from that test, as well as from my renal ultrasound performed on Thurs., 10/2, be posted to my chart on this website eventually?

## 2019-11-05 ENCOUNTER — PATIENT MESSAGE (OUTPATIENT)
Dept: NEUROSURGERY | Facility: CLINIC | Age: 69
End: 2019-11-05

## 2019-12-05 ENCOUNTER — HOSPITAL ENCOUNTER (OUTPATIENT)
Dept: CARDIOLOGY | Facility: HOSPITAL | Age: 69
Discharge: HOME | End: 2019-12-05

## 2019-12-05 ENCOUNTER — HOSPITAL ENCOUNTER (OUTPATIENT)
Dept: MRI IMAGING | Facility: HOSPITAL | Age: 69
Discharge: HOME | End: 2019-12-05
Admitting: INTERNAL MEDICINE

## 2019-12-05 ENCOUNTER — TELEPHONE (OUTPATIENT)
Dept: CARDIOLOGY | Facility: CLINIC | Age: 69
End: 2019-12-05

## 2019-12-05 VITALS — WEIGHT: 115 LBS | HEIGHT: 66 IN | BODY MASS INDEX: 18.48 KG/M2

## 2019-12-05 DIAGNOSIS — I70.1 RENAL ARTERY STENOSIS (HCC): ICD-10-CM

## 2019-12-05 DIAGNOSIS — R06.02 SHORTNESS OF BREATH: ICD-10-CM

## 2019-12-05 LAB
BH CV ECHO MEAS - AO MAX PG (FULL): 2.8 MMHG
BH CV ECHO MEAS - AO MAX PG: 7.8 MMHG
BH CV ECHO MEAS - AO ROOT AREA (BSA CORRECTED): 1.9
BH CV ECHO MEAS - AO ROOT AREA: 7.3 CM^2
BH CV ECHO MEAS - AO ROOT DIAM: 3 CM
BH CV ECHO MEAS - AO V2 MAX: 139.9 CM/SEC
BH CV ECHO MEAS - AVA(V,A): 2.1 CM^2
BH CV ECHO MEAS - AVA(V,D): 2.1 CM^2
BH CV ECHO MEAS - BSA(HAYCOCK): 1.5 M^2
BH CV ECHO MEAS - BSA: 1.6 M^2
BH CV ECHO MEAS - BZI_BMI: 18.6 KILOGRAMS/M^2
BH CV ECHO MEAS - BZI_METRIC_HEIGHT: 167.6 CM
BH CV ECHO MEAS - BZI_METRIC_WEIGHT: 52.2 KG
BH CV ECHO MEAS - EDV(CUBED): 24.9 ML
BH CV ECHO MEAS - EDV(MOD-SP2): 101 ML
BH CV ECHO MEAS - EDV(MOD-SP4): 85 ML
BH CV ECHO MEAS - EDV(TEICH): 32.8 ML
BH CV ECHO MEAS - EF(CUBED): 44.2 %
BH CV ECHO MEAS - EF(MOD-BP): 50 %
BH CV ECHO MEAS - EF(MOD-SP2): 52.5 %
BH CV ECHO MEAS - EF(MOD-SP4): 44.7 %
BH CV ECHO MEAS - EF(TEICH): 38.2 %
BH CV ECHO MEAS - ESV(CUBED): 13.9 ML
BH CV ECHO MEAS - ESV(MOD-SP2): 48 ML
BH CV ECHO MEAS - ESV(MOD-SP4): 47 ML
BH CV ECHO MEAS - ESV(TEICH): 20.3 ML
BH CV ECHO MEAS - FS: 17.7 %
BH CV ECHO MEAS - IVS/LVPW: 1
BH CV ECHO MEAS - IVSD: 0.68 CM
BH CV ECHO MEAS - LA DIMENSION: 2.6 CM
BH CV ECHO MEAS - LA/AO: 0.85
BH CV ECHO MEAS - LAD MAJOR: 3.6 CM
BH CV ECHO MEAS - LAT PEAK E' VEL: 10.6 CM/SEC
BH CV ECHO MEAS - LATERAL E/E' RATIO: 7.3
BH CV ECHO MEAS - LV DIASTOLIC VOL/BSA (35-75): 53.8 ML/M^2
BH CV ECHO MEAS - LV MASS(C)D: 43.8 GRAMS
BH CV ECHO MEAS - LV MASS(C)DI: 27.7 GRAMS/M^2
BH CV ECHO MEAS - LV MAX PG: 5 MMHG
BH CV ECHO MEAS - LV MEAN PG: 2.3 MMHG
BH CV ECHO MEAS - LV SYSTOLIC VOL/BSA (12-30): 29.7 ML/M^2
BH CV ECHO MEAS - LV V1 MAX: 111.6 CM/SEC
BH CV ECHO MEAS - LV V1 MEAN: 70.4 CM/SEC
BH CV ECHO MEAS - LV V1 VTI: 22.8 CM
BH CV ECHO MEAS - LVIDD: 2.9 CM
BH CV ECHO MEAS - LVIDS: 2.4 CM
BH CV ECHO MEAS - LVLD AP2: 7.3 CM
BH CV ECHO MEAS - LVLD AP4: 7.8 CM
BH CV ECHO MEAS - LVLS AP2: 6.7 CM
BH CV ECHO MEAS - LVLS AP4: 6.9 CM
BH CV ECHO MEAS - LVOT AREA (M): 2.5 CM^2
BH CV ECHO MEAS - LVOT AREA: 2.7 CM^2
BH CV ECHO MEAS - LVOT DIAM: 1.8 CM
BH CV ECHO MEAS - LVPWD: 0.65 CM
BH CV ECHO MEAS - MED PEAK E' VEL: 9.5 CM/SEC
BH CV ECHO MEAS - MEDIAL E/E' RATIO: 8.1
BH CV ECHO MEAS - MR MAX PG: 95 MMHG
BH CV ECHO MEAS - MR MAX VEL: 481.4 CM/SEC
BH CV ECHO MEAS - MR MEAN PG: 61 MMHG
BH CV ECHO MEAS - MR MEAN VEL: 371.9 CM/SEC
BH CV ECHO MEAS - MR VTI: 174.2 CM
BH CV ECHO MEAS - MV A MAX VEL: 62.2 CM/SEC
BH CV ECHO MEAS - MV DEC TIME: 0.17 SEC
BH CV ECHO MEAS - MV E MAX VEL: 79 CM/SEC
BH CV ECHO MEAS - MV E/A: 1.3
BH CV ECHO MEAS - MV MAX PG: 5.1 MMHG
BH CV ECHO MEAS - MV MEAN PG: 2 MMHG
BH CV ECHO MEAS - MV V2 MAX: 112.9 CM/SEC
BH CV ECHO MEAS - MV V2 MEAN: 67.3 CM/SEC
BH CV ECHO MEAS - MV V2 VTI: 31.5 CM
BH CV ECHO MEAS - MVA(VTI): 1.9 CM^2
BH CV ECHO MEAS - PA ACC SLOPE: 527.7 CM/SEC^2
BH CV ECHO MEAS - PA ACC TIME: 0.12 SEC
BH CV ECHO MEAS - PA MAX PG: 4.3 MMHG
BH CV ECHO MEAS - PA PR(ACCEL): 25.1 MMHG
BH CV ECHO MEAS - PA V2 MAX: 103.7 CM/SEC
BH CV ECHO MEAS - RAP SYSTOLE: 3 MMHG
BH CV ECHO MEAS - RVSP: 23 MMHG
BH CV ECHO MEAS - SI(CUBED): 7 ML/M^2
BH CV ECHO MEAS - SI(LVOT): 38.7 ML/M^2
BH CV ECHO MEAS - SI(MOD-SP2): 33.5 ML/M^2
BH CV ECHO MEAS - SI(MOD-SP4): 24 ML/M^2
BH CV ECHO MEAS - SI(TEICH): 7.9 ML/M^2
BH CV ECHO MEAS - SV(CUBED): 11 ML
BH CV ECHO MEAS - SV(LVOT): 61.3 ML
BH CV ECHO MEAS - SV(MOD-SP2): 53 ML
BH CV ECHO MEAS - SV(MOD-SP4): 38 ML
BH CV ECHO MEAS - SV(TEICH): 12.5 ML
BH CV ECHO MEAS - TAPSE (>1.6): 2.3 CM2
BH CV ECHO MEAS - TR MAX PG: 20 MMHG
BH CV ECHO MEAS - TR MAX VEL: 223.7 CM/SEC
BH CV ECHO MEASUREMENTS AVERAGE E/E' RATIO: 7.86
BH CV VAS BP LEFT ARM: NORMAL MMHG
BH CV XLRA - RV BASE: 2.7 CM
BH CV XLRA - RV LENGTH: 5.5 CM
BH CV XLRA - RV MID: 1.9 CM
BH CV XLRA - TDI S': 12.1 CM/SEC
LEFT ATRIUM VOLUME INDEX: 18.3 ML/M^2
LEFT ATRIUM VOLUME: 29 ML
MAXIMAL PREDICTED HEART RATE: 153 BPM
STRESS TARGET HR: 130 BPM

## 2019-12-05 PROCEDURE — A9577 INJ MULTIHANCE: HCPCS | Performed by: INTERNAL MEDICINE

## 2019-12-05 PROCEDURE — 82565 ASSAY OF CREATININE: CPT

## 2019-12-05 PROCEDURE — 93306 TTE W/DOPPLER COMPLETE: CPT | Performed by: INTERNAL MEDICINE

## 2019-12-05 PROCEDURE — 25010000002 SULFUR HEXAFLUORIDE MICROSPH 60.7-25 MG RECONSTITUTED SUSPENSION: Performed by: INTERNAL MEDICINE

## 2019-12-05 PROCEDURE — C8900 MRA W/CONT, ABD: HCPCS

## 2019-12-05 PROCEDURE — 0 GADOBENATE DIMEGLUMINE 529 MG/ML SOLUTION: Performed by: INTERNAL MEDICINE

## 2019-12-05 PROCEDURE — 93306 TTE W/DOPPLER COMPLETE: CPT

## 2019-12-05 RX ADMIN — SULFUR HEXAFLUORIDE 2 ML: KIT at 10:00

## 2019-12-05 RX ADMIN — GADOBENATE DIMEGLUMINE 13 ML: 529 INJECTION, SOLUTION INTRAVENOUS at 11:51

## 2019-12-05 NOTE — TELEPHONE ENCOUNTER
----- Message from Hernán Allen III, MD sent at 12/5/2019  3:04 PM EST -----  Normal heart function on echo

## 2019-12-06 ENCOUNTER — TELEPHONE (OUTPATIENT)
Dept: CARDIOLOGY | Facility: CLINIC | Age: 69
End: 2019-12-06

## 2019-12-06 LAB — CREAT BLDA-MCNC: 0.6 MG/DL (ref 0.6–1.3)

## 2019-12-06 NOTE — TELEPHONE ENCOUNTER
----- Message from Hernán Allen III, MD sent at 12/6/2019  8:13 AM EST -----  MRI of the arteries feeding blood supply to your kidneys reveals changes consistent with mild fibromuscular dysplasia which is not affectingThe blood flow to your kidneys.

## 2019-12-06 NOTE — TELEPHONE ENCOUNTER
Called patient to let them know results and recommendations per JWL (see JWL note).    Left voicemail

## 2020-08-17 ENCOUNTER — TELEPHONE (OUTPATIENT)
Dept: NEUROSURGERY | Facility: CLINIC | Age: 70
End: 2020-08-17

## 2020-08-17 DIAGNOSIS — I65.21 STENOSIS OF RIGHT CAROTID ARTERY: Primary | ICD-10-CM

## 2020-08-17 NOTE — TELEPHONE ENCOUNTER
Patient sent message to schedule yearly follow up with Carotid US. Can someone please enter a new order for scheduling?

## 2020-09-17 ENCOUNTER — HOSPITAL ENCOUNTER (OUTPATIENT)
Dept: CARDIOLOGY | Facility: HOSPITAL | Age: 70
Discharge: HOME | End: 2020-09-17
Admitting: PHYSICIAN ASSISTANT

## 2020-09-17 VITALS — BODY MASS INDEX: 18.05 KG/M2 | WEIGHT: 115 LBS | HEIGHT: 67 IN

## 2020-09-17 DIAGNOSIS — I65.21 STENOSIS OF RIGHT CAROTID ARTERY: ICD-10-CM

## 2020-09-17 PROCEDURE — 93880 EXTRACRANIAL BILAT STUDY: CPT | Performed by: INTERNAL MEDICINE

## 2020-09-17 PROCEDURE — 93880 EXTRACRANIAL BILAT STUDY: CPT

## 2020-09-18 LAB
BH CV ECHO MEAS - BSA(HAYCOCK): 1.5 M^2
BH CV ECHO MEAS - BSA: 1.6 M^2
BH CV ECHO MEAS - BZI_BMI: 18.6 KILOGRAMS/M^2
BH CV ECHO MEAS - BZI_METRIC_HEIGHT: 167.6 CM
BH CV ECHO MEAS - BZI_METRIC_WEIGHT: 52.2 KG
BH CV XLRA MEAS LEFT DIST CCA EDV: 33.8 CM/SEC
BH CV XLRA MEAS LEFT DIST CCA PSV: 112 CM/SEC
BH CV XLRA MEAS LEFT DIST ICA EDV: 19.6 CM/SEC
BH CV XLRA MEAS LEFT DIST ICA PSV: 59.7 CM/SEC
BH CV XLRA MEAS LEFT ICA/CCA RATIO: 1.1
BH CV XLRA MEAS LEFT MID CCA EDV: 33.8 CM/SEC
BH CV XLRA MEAS LEFT MID CCA PSV: 106 CM/SEC
BH CV XLRA MEAS LEFT MID ICA EDV: 34.9 CM/SEC
BH CV XLRA MEAS LEFT MID ICA PSV: 92.9 CM/SEC
BH CV XLRA MEAS LEFT PROX CCA EDV: 29.9 CM/SEC
BH CV XLRA MEAS LEFT PROX CCA PSV: 99.8 CM/SEC
BH CV XLRA MEAS LEFT PROX ECA EDV: 13.3 CM/SEC
BH CV XLRA MEAS LEFT PROX ECA PSV: 85.4 CM/SEC
BH CV XLRA MEAS LEFT PROX ICA EDV: 35.2 CM/SEC
BH CV XLRA MEAS LEFT PROX ICA PSV: 106 CM/SEC
BH CV XLRA MEAS LEFT PROX SCLA PSV: 137 CM/SEC
BH CV XLRA MEAS LEFT VERTEBRAL A EDV: 27 CM/SEC
BH CV XLRA MEAS LEFT VERTEBRAL A PSV: 89.4 CM/SEC
BH CV XLRA MEAS RIGHT DIST CCA EDV: 30.2 CM/SEC
BH CV XLRA MEAS RIGHT DIST CCA PSV: 85.5 CM/SEC
BH CV XLRA MEAS RIGHT DIST ICA EDV: 39.6 CM/SEC
BH CV XLRA MEAS RIGHT DIST ICA PSV: 98.7 CM/SEC
BH CV XLRA MEAS RIGHT ICA/CCA RATIO: 1.9
BH CV XLRA MEAS RIGHT MID CCA EDV: 33.3 CM/SEC
BH CV XLRA MEAS RIGHT MID CCA PSV: 94.3 CM/SEC
BH CV XLRA MEAS RIGHT MID ICA EDV: 62 CM/SEC
BH CV XLRA MEAS RIGHT MID ICA PSV: 175 CM/SEC
BH CV XLRA MEAS RIGHT PROX CCA EDV: 28.3 CM/SEC
BH CV XLRA MEAS RIGHT PROX CCA PSV: 79.2 CM/SEC
BH CV XLRA MEAS RIGHT PROX ECA EDV: 18.9 CM/SEC
BH CV XLRA MEAS RIGHT PROX ECA PSV: 93 CM/SEC
BH CV XLRA MEAS RIGHT PROX ICA EDV: 33.4 CM/SEC
BH CV XLRA MEAS RIGHT PROX ICA PSV: 83.5 CM/SEC
BH CV XLRA MEAS RIGHT PROX SCLA PSV: 112 CM/SEC
BH CV XLRA MEAS RIGHT VERTEBRAL A EDV: 19.2 CM/SEC
BH CV XLRA MEAS RIGHT VERTEBRAL A PSV: 65.3 CM/SEC
LEFT ARM BP: NORMAL MMHG
RIGHT ARM BP: NORMAL MMHG

## 2020-09-21 ENCOUNTER — OFFICE VISIT (OUTPATIENT)
Dept: NEUROSURGERY | Facility: CLINIC | Age: 70
End: 2020-09-21

## 2020-09-21 VITALS
BODY MASS INDEX: 21.03 KG/M2 | TEMPERATURE: 97.3 F | SYSTOLIC BLOOD PRESSURE: 135 MMHG | DIASTOLIC BLOOD PRESSURE: 86 MMHG | RESPIRATION RATE: 15 BRPM | HEIGHT: 67 IN | WEIGHT: 134 LBS

## 2020-09-21 DIAGNOSIS — I77.3 FIBROMUSCULAR DYSPLASIA (HCC): ICD-10-CM

## 2020-09-21 DIAGNOSIS — I65.21 STENOSIS OF RIGHT CAROTID ARTERY: Primary | ICD-10-CM

## 2020-09-21 PROCEDURE — 99214 OFFICE O/P EST MOD 30 MIN: CPT | Performed by: NEUROLOGICAL SURGERY

## 2020-09-21 NOTE — PROGRESS NOTES
NAME: CYNTHIA ANN BLOCH   DOS: 2020  : 1950  PCP: Emeterio Duran MD    Chief Complaint:    Chief Complaint   Patient presents with   • Carotid stenosis     1 year f/u carotid duplex       History of Present Illness:  70 y.o. female   Is a 70-year-old in follow-up for the presence of some asymptomatic carotid bruits she has a likely diagnosis of fibromuscular dysplasia and some tortuosity of the distal internal carotid she had had some elevated carotid velocities and is here for follow-up she denies any symptoms she seen Dr. Allen for cardiac issues and has been followed up with renal artery studies to check for FMD    PMHX  Allergies:  Allergies   Allergen Reactions   • Diclofenac Palpitations   • Ibuprofen Palpitations   • Sulfa Antibiotics Rash     Medications    Current Outpatient Medications:   •  aspirin 81 MG EC tablet, Take 81 mg by mouth Daily., Disp: , Rfl:   •  atorvastatin (LIPITOR) 20 MG tablet, 20 mg Daily., Disp: , Rfl:   •  calcium carbonate (OS-KADIE) 600 MG tablet, Take 1,200 mg by mouth Daily., Disp: , Rfl:   •  Cholecalciferol (VITAMIN D-3 PO), Take 1,000 mg by mouth Daily., Disp: , Rfl:   •  estradiol (ESTRACE) 0.1 MG/GM vaginal cream, Insert 1 g into the vagina 1 (One) Time Per Week., Disp: , Rfl:   •  medroxyPROGESTERone (PROVERA) 2.5 MG tablet, Take 2.5 mg by mouth 3 (Three) Times a Week., Disp: , Rfl:   •  Multiple Vitamins-Minerals (MULTIVITAMIN ADULT PO), Take  by mouth Daily., Disp: , Rfl:   •  omeprazole (priLOSEC) 40 MG capsule, Take 40 mg by mouth As Needed., Disp: , Rfl:   •  Psyllium (METAMUCIL PO), Take  by mouth Daily., Disp: , Rfl:   Past Medical History:  Past Medical History:   Diagnosis Date   • Cancer (CMS/HCC)     (Basal cell) --Removed from upper lip   • Osteoporosis      Past Surgical History:  Past Surgical History:   Procedure Laterality Date   • SKIN CANCER EXCISION      Upper lip     Social Hx:  Social History     Tobacco Use   • Smoking status: Never  Smoker   • Smokeless tobacco: Never Used   Substance Use Topics   • Alcohol use: Yes   • Drug use: No     Family Hx:  Family History   Problem Relation Age of Onset   • Hypertension Mother    • Heart disease Father      Review of Systems:        Review of Systems   Constitutional: Negative for activity change, appetite change, chills, diaphoresis, fatigue, fever and unexpected weight change.   HENT: Negative for congestion, dental problem, drooling, ear discharge, ear pain, facial swelling, hearing loss, mouth sores, nosebleeds, postnasal drip, rhinorrhea, sinus pressure, sneezing, sore throat, tinnitus, trouble swallowing and voice change.    Eyes: Negative for photophobia, pain, discharge, redness, itching and visual disturbance.   Respiratory: Negative for apnea, cough, choking, chest tightness, shortness of breath, wheezing and stridor.    Cardiovascular: Negative for chest pain, palpitations and leg swelling.   Gastrointestinal: Negative for abdominal distention, abdominal pain, anal bleeding, blood in stool, constipation, diarrhea, nausea, rectal pain and vomiting.   Endocrine: Negative for cold intolerance, heat intolerance, polydipsia, polyphagia and polyuria.   Genitourinary: Negative for decreased urine volume, difficulty urinating, dysuria, enuresis, flank pain, frequency, genital sores, hematuria and urgency.   Musculoskeletal: Negative for arthralgias, back pain, gait problem, joint swelling, myalgias, neck pain and neck stiffness.   Skin: Negative for color change, pallor, rash and wound.   Allergic/Immunologic: Negative for environmental allergies, food allergies and immunocompromised state.   Neurological: Negative for dizziness, tremors, seizures, syncope, facial asymmetry, speech difficulty, weakness, light-headedness, numbness and headaches.   Hematological: Negative for adenopathy. Does not bruise/bleed easily.   Psychiatric/Behavioral: Negative for agitation, behavioral problems, confusion,  "decreased concentration, dysphoric mood, hallucinations, self-injury, sleep disturbance and suicidal ideas. The patient is not nervous/anxious and is not hyperactive.    All other systems reviewed and are negative.     I have reviewed this note template and all pertinent parts of the review of systems social, family history, surgical history and medication list      Physical Examination:  Vitals:    09/21/20 1138   BP: 135/86   Resp: 15   Temp: 97.3 °F (36.3 °C)      General Appearance:   Well developed, well nourished, well groomed, alert, and cooperative.  Neurological examination:  Neurologic Exam    Vital signs were reviewed and documented in the chart  Patient appeared in good neurologic function with normal comprehension fluent speech  Mood and affect are normal  Sense of smell deferred    Pupils symmetric equally reactive funduscopic exam not visualized   Visual fields intact to confrontation  Extraocular movements intact  Face motor function is symmetric  Facial sensations normal  Hearing intact to finger rub hearing intact to finger rub  Tongue is midline  Palate symmetric  Swallowing normal  Shoulder shrug normal  No detectable carotid bruits today  Muscle bulk and tone normal  5 out of 5 strength no motor drift  Gait normal intact  Negative Romberg  No clonus long tract signs or myelopathy    Reflexes symmetric  No edema noted and extremities skin appears normal-arms and legs well perfused    Review of Imaging/DATA:  Carotid ultrasound was reviewed that demonstrates no evidence of progressive stenosis actuality she is probably decreased compared to her last studies-she has had multiple carotid imaging studies that demonstrated a higher grade stenosis but they have not been confirmed on CTA as  Diagnoses/Plan:    Ms. Bloch is a 70 y.o. female   Asymptomatic higher grade FMD likely related carotid stenosis no evidence of critical stenosis old CTA demonstrated \"high-grade \"stenosis which is actually " confirmed as much less on her carotid ultrasound imaging if anything is better today-I suspect that she has multiple areas of her upper cervical carotid that are regular that we can confirm with diagnostic angiogram but I would defer that at the present time given the asymptomatic nature I have a low threshold to do a diagnostic catheter angiogram should she develop any symptoms I would continue her on aspirin    She is to continue aspirin and statin medicines for general stroke risk factor modification reduction and from a neuro interventional standpoint I do not think that she needs surgery at the present time I recommended a follow-up for medical management as scheduled with her primary care doctor and explained to her that probably around 2 to 3 years from now she should follow-up a carotid ultrasound to ensure there is no progression understanding that she probably does not have severe atherosclerotic related disease.    I have explained explicitly the signs and symptoms to look for her history of vertigo is probably unrelated-but did explain the signs and symptoms that should necessitate a referral to the emergency room or the clinic I spent 30 minutes with her counseling face-to-face 50% of that time and reviewing her studies

## 2020-11-04 ENCOUNTER — OFFICE VISIT (OUTPATIENT)
Dept: CARDIOLOGY | Facility: CLINIC | Age: 70
End: 2020-11-04

## 2020-11-04 VITALS
BODY MASS INDEX: 18.58 KG/M2 | HEART RATE: 70 BPM | HEIGHT: 66 IN | OXYGEN SATURATION: 99 % | SYSTOLIC BLOOD PRESSURE: 124 MMHG | WEIGHT: 115.6 LBS | DIASTOLIC BLOOD PRESSURE: 70 MMHG | TEMPERATURE: 96.2 F

## 2020-11-04 DIAGNOSIS — I44.7 LBBB (LEFT BUNDLE BRANCH BLOCK): Primary | ICD-10-CM

## 2020-11-04 DIAGNOSIS — I77.3 FIBROMUSCULAR DYSPLASIA (HCC): ICD-10-CM

## 2020-11-04 PROCEDURE — 99213 OFFICE O/P EST LOW 20 MIN: CPT | Performed by: INTERNAL MEDICINE

## 2020-11-04 RX ORDER — MELOXICAM 15 MG/1
TABLET ORAL AS NEEDED
COMMUNITY
Start: 2020-10-26

## 2020-11-04 NOTE — PROGRESS NOTES
[facility]  Office visit  Cynthia Ann Bloch  1950  [phone]  There is no work phone number on file.    VISIT DATE:  11/4/2020    PCP: Emeterio Duran MD  [address]  [facility] [address]    CC:  Chief Complaint   Patient presents with   • Follow-up       Previous cardiac studies and procedures:  12/2019   echo  · Left ventricular systolic function is normal.  · Estimated EF appears to be in the range of 56 - 60%.  MRA  1.  MRA evidence of fibromuscular dysplasia involving the mid and distal portions of solitary right renal artery and the distal portion of dominant left renal artery.  2.  No significant renal artery stenosis is visible. The renal artery origins are widely patent.  3.  No renal atrophy or focal parenchymal scarring.    9/2020 carotid duplex  · Proximal right internal carotid artery plaque without significant stenosis, <20%.  · Proximal left internal carotid artery plaque without significant stenosis, <49%.  · Elevated velocities noted in the mid right internal carotid artery secondary to vessel tortuosity.    ASSESSMENT:   Diagnosis Plan   1. LBBB (left bundle branch block)     2. Fibromuscular dysplasia (CMS/HCC)       Carotid imaging, bilateral renal duplex imaging and MRA abdomen consistent with fibromuscular dysplasia involving right greater than left carotid arteries and mid and distal portions of the bilateral renal arteries.  Currently asymptomatic with preserved renal function, no resistant hypertension and normal renal size.    PLAN:  -Agree with neurosurgical colleague regarding interval assessment of fibromuscular disease involving her carotids with carotid duplex every 2 to 3 years, will keep same schedule with regard to renal duplex imaging as well.  -Continue low-dose aspirin and statin for stroke prophylaxis.  Excellent lipid profile.    Subjective  Interval evaluation: No change in baseline functional capacity.  Denies  "lightheadedness, dizziness, visual changes, headache, paresthesias or focal weakness.  Blood pressures running less than 135/85 mmHg.  Reviewed most recent fasting lipid panel.  She is compliant with medical therapy.    Initial evaluation: 69-year-old female who underwent carotid imaging after bilateral carotid bruits were uncovered on routine exam.  Serial carotid duplex imaging and CTA most consistent with fibromuscular dysplasia.  Bilateral renal artery duplex imaging also most consistent with fibromuscular dysplasia involving the distal renal arteries bilaterally.  Normal renal size.  Normal renal function.  Blood pressures running less than 130/80 mmHg.  She has no TIA or strokelike symptoms.  She has a known history of a chronic left bundle branch block.  Denies chest discomfort or palpitations.  Mild shortness of breath and a mild class II pattern.  She appears to be a very fit and active individual.    PHYSICAL EXAMINATION:  Vitals:    11/04/20 0931   BP: 124/70   BP Location: Right arm   Patient Position: Sitting   Pulse: 70   Temp: 96.2 °F (35.7 °C)   SpO2: 99%   Weight: 52.4 kg (115 lb 9.6 oz)   Height: 167.6 cm (66\")     General Appearance:    Alert, cooperative, no distress, appears stated age   Head:    Normocephalic, without obvious abnormality, atraumatic   Eyes:    conjunctiva/corneas clear   Nose:   Nares normal, septum midline, mucosa normal, no drainage   Throat:   Lips, teeth and gums normal   Neck:   Supple, symmetrical, trachea midline, no carotid    bruit or JVD   Lungs:     Clear to auscultation bilaterally, respirations unlabored   Chest Wall:    No tenderness or deformity    Heart:    Regular rate and rhythm, S1 and S2 normal, no murmur, rub   or gallop, normal carotid impulse bilaterally without bruit.   Abdomen:     Soft, non-tender, no abdominal bruit   Extremities:   Extremities normal, atraumatic, no cyanosis or edema   Pulses:   2+ and symmetric all extremities   Skin:   Skin color, " texture, turgor normal, no rashes or lesions       Diagnostic Data:  Procedures  No results found for: CHLPL, TRIG, HDL, LDLDIRECT  Lab Results   Component Value Date    CREATININE 0.60 12/05/2019     No results found for: HGBA1C  No results found for: WBC, HGB, HCT, PLT    Allergies  Allergies   Allergen Reactions   • Diclofenac Palpitations   • Ibuprofen Palpitations   • Sulfa Antibiotics Rash       Current Medications    Current Outpatient Medications:   •  aspirin 81 MG EC tablet, Take 81 mg by mouth Daily., Disp: , Rfl:   •  atorvastatin (LIPITOR) 20 MG tablet, 20 mg Daily., Disp: , Rfl:   •  calcium carbonate (OS-KADIE) 600 MG tablet, Take 1,200 mg by mouth Daily., Disp: , Rfl:   •  Cholecalciferol (VITAMIN D-3 PO), Take 1,000 mg by mouth Daily., Disp: , Rfl:   •  estradiol (ESTRACE) 0.1 MG/GM vaginal cream, Insert 1 g into the vagina 1 (One) Time Per Week., Disp: , Rfl:   •  medroxyPROGESTERone (PROVERA) 2.5 MG tablet, Take 2.5 mg by mouth 3 (Three) Times a Week., Disp: , Rfl:   •  Multiple Vitamins-Minerals (MULTIVITAMIN ADULT PO), Take  by mouth Daily., Disp: , Rfl:   •  omeprazole (priLOSEC) 40 MG capsule, Take 40 mg by mouth As Needed., Disp: , Rfl:   •  Psyllium (METAMUCIL PO), Take  by mouth Daily., Disp: , Rfl:   •  meloxicam (MOBIC) 15 MG tablet, As Needed., Disp: , Rfl:           ROS  Review of Systems   HENT: Negative for tinnitus.    Cardiovascular: Negative for chest pain, dyspnea on exertion, irregular heartbeat, leg swelling, near-syncope and syncope.   Respiratory: Negative for cough, shortness of breath and snoring.          SOCIAL HX  Social History     Socioeconomic History   • Marital status:      Spouse name: Not on file   • Number of children: Not on file   • Years of education: Not on file   • Highest education level: Not on file   Tobacco Use   • Smoking status: Never Smoker   • Smokeless tobacco: Never Used   Substance and Sexual Activity   • Alcohol use: Yes     Comment: social    • Drug use: No   • Sexual activity: Defer       FAMILY HX  Family History   Problem Relation Age of Onset   • Hypertension Mother    • Heart disease Father              Hernán Allen III, MD, FACC

## 2023-08-23 ENCOUNTER — TELEPHONE (OUTPATIENT)
Dept: NEUROSURGERY | Facility: CLINIC | Age: 73
End: 2023-08-23
Payer: MEDICARE

## 2023-08-23 DIAGNOSIS — I65.23 CAROTID STENOSIS, BILATERAL: Primary | ICD-10-CM

## 2023-08-23 NOTE — TELEPHONE ENCOUNTER
Pt is scheduled tomorrow with Dr. Goodson for 3 year f/u. She will need an updated carotid ultrasound and f/u with PA-C afterwards. Please r/s this appointment.

## 2023-10-09 ENCOUNTER — HOSPITAL ENCOUNTER (OUTPATIENT)
Dept: CARDIOLOGY | Facility: HOSPITAL | Age: 73
Discharge: HOME | End: 2023-10-09
Admitting: NEUROLOGICAL SURGERY
Payer: MEDICARE

## 2023-10-09 ENCOUNTER — OFFICE VISIT (OUTPATIENT)
Dept: NEUROSURGERY | Facility: CLINIC | Age: 73
End: 2023-10-09
Payer: MEDICARE

## 2023-10-09 VITALS
DIASTOLIC BLOOD PRESSURE: 86 MMHG | SYSTOLIC BLOOD PRESSURE: 138 MMHG | TEMPERATURE: 96.2 F | WEIGHT: 116 LBS | HEIGHT: 66 IN | BODY MASS INDEX: 18.64 KG/M2

## 2023-10-09 VITALS — BODY MASS INDEX: 18.16 KG/M2 | HEIGHT: 66 IN | WEIGHT: 113 LBS

## 2023-10-09 DIAGNOSIS — I65.23 CAROTID STENOSIS, BILATERAL: Primary | ICD-10-CM

## 2023-10-09 LAB
BH CV XLRA MEAS LEFT DIST CCA EDV: 33.4 CM/SEC
BH CV XLRA MEAS LEFT DIST CCA PSV: 93.2 CM/SEC
BH CV XLRA MEAS LEFT DIST ICA EDV: 53 CM/SEC
BH CV XLRA MEAS LEFT DIST ICA PSV: 132 CM/SEC
BH CV XLRA MEAS LEFT ICA/CCA RATIO: 2.46
BH CV XLRA MEAS LEFT MID CCA EDV: 32.2 CM/SEC
BH CV XLRA MEAS LEFT MID CCA PSV: 93.2 CM/SEC
BH CV XLRA MEAS LEFT MID ICA EDV: 83 CM/SEC
BH CV XLRA MEAS LEFT MID ICA PSV: 229 CM/SEC
BH CV XLRA MEAS LEFT PROX CCA EDV: 28.7 CM/SEC
BH CV XLRA MEAS LEFT PROX CCA PSV: 84.4 CM/SEC
BH CV XLRA MEAS LEFT PROX ECA EDV: 14.1 CM/SEC
BH CV XLRA MEAS LEFT PROX ECA PSV: 72.7 CM/SEC
BH CV XLRA MEAS LEFT PROX ICA EDV: 34 CM/SEC
BH CV XLRA MEAS LEFT PROX ICA PSV: 66.8 CM/SEC
BH CV XLRA MEAS LEFT PROX SCLA PSV: 112 CM/SEC
BH CV XLRA MEAS LEFT VERTEBRAL A PSV: 82.1 CM/SEC
BH CV XLRA MEAS RIGHT DIST CCA EDV: 31.1 CM/SEC
BH CV XLRA MEAS RIGHT DIST CCA PSV: 85.6 CM/SEC
BH CV XLRA MEAS RIGHT DIST ICA EDV: 36.9 CM/SEC
BH CV XLRA MEAS RIGHT DIST ICA PSV: 105 CM/SEC
BH CV XLRA MEAS RIGHT ICA/CCA RATIO: 3.68
BH CV XLRA MEAS RIGHT MID CCA EDV: 35.2 CM/SEC
BH CV XLRA MEAS RIGHT MID CCA PSV: 92 CM/SEC
BH CV XLRA MEAS RIGHT MID ICA EDV: 107 CM/SEC
BH CV XLRA MEAS RIGHT MID ICA PSV: 313 CM/SEC
BH CV XLRA MEAS RIGHT PROX CCA EDV: 31.1 CM/SEC
BH CV XLRA MEAS RIGHT PROX CCA PSV: 81.5 CM/SEC
BH CV XLRA MEAS RIGHT PROX ECA EDV: 18.1 CM/SEC
BH CV XLRA MEAS RIGHT PROX ECA PSV: 76.2 CM/SEC
BH CV XLRA MEAS RIGHT PROX ICA EDV: 31.1 CM/SEC
BH CV XLRA MEAS RIGHT PROX ICA PSV: 72.1 CM/SEC
BH CV XLRA MEAS RIGHT PROX SCLA PSV: 105 CM/SEC
BH CV XLRA MEAS RIGHT VERTEBRAL A EDV: 34.6 CM/SEC
BH CV XLRA MEAS RIGHT VERTEBRAL A PSV: 109 CM/SEC
LEFT ARM BP: NORMAL MMHG
RIGHT ARM BP: NORMAL MMHG

## 2023-10-09 PROCEDURE — 93880 EXTRACRANIAL BILAT STUDY: CPT | Performed by: INTERNAL MEDICINE

## 2023-10-09 PROCEDURE — 93880 EXTRACRANIAL BILAT STUDY: CPT

## 2023-10-09 PROCEDURE — 99204 OFFICE O/P NEW MOD 45 MIN: CPT | Performed by: PHYSICIAN ASSISTANT

## 2023-10-09 RX ORDER — OMEPRAZOLE 40 MG/1
40 CAPSULE, DELAYED RELEASE ORAL
COMMUNITY
Start: 2023-01-01

## 2023-10-09 NOTE — PROGRESS NOTES
Patient: Cynthia Ann Bloch  : 1950    Primary Care Provider: Emeterio Duran MD      Chief Complaint: Duplex follow-up    History of Present Illness:       Patient is a very nice 73-year-old female seen by Dr. Goodson at the request of Dr. Allen.  Patient was worked up for carotid bruit by Dr. Duran and abdominal ultrasound confirmed FMD.    Patient has been monitored with serial duplexes that show up and down velocities but the most recent one done today showed some elevated velocities on the right specifically in the left was a little bit higher as well.    I reviewed the case with Dr. Goodson and we have elected to repeat ultrasound in 6 months along with an MRA of the head and neck to check for any high-grade blockages.    Patient is still asymptomatic and symptom-free therefore we tried to treat this as conservatively as possible.    Signs and symptoms that would be considered symptomatic of been reviewed with the patient she is well aware.    Patient is going to continue taking her aspirin and statin    Review of Systems   Constitutional:  Negative for activity change, appetite change, chills, fatigue and fever.   HENT:  Negative for congestion, dental problem, ear pain, hearing loss, sinus pressure and tinnitus.    Eyes:  Negative for pain and redness.   Respiratory:  Negative for apnea, cough, shortness of breath and wheezing.    Cardiovascular:  Negative for chest pain, palpitations and leg swelling.   Gastrointestinal:  Negative for abdominal distention, abdominal pain, blood in stool, constipation, diarrhea, nausea and vomiting.   Endocrine: Negative for cold intolerance, heat intolerance and polyuria.   Genitourinary:  Negative for enuresis, frequency and urgency.   Skin:  Negative for color change and rash.   Neurological:  Negative for dizziness, tremors, seizures, syncope, speech difficulty, weakness, light-headedness, numbness and headaches.   Psychiatric/Behavioral:  Negative for behavioral  "problems and confusion. The patient is not nervous/anxious.        Past Medical History:     Past Medical History:   Diagnosis Date    Abnormal ECG     Chronic Left Bundle Branch Block    Cancer     (Basal cell) --Removed from upper lip    Carotid artery occlusion 2019    Coronary artery disease     ?Carotid artery stenosis    MRSA (methicillin resistant Staphylococcus aureus) 2010    acquired after emergency appendectomy    Osteoporosis        Family History:     Family History   Problem Relation Age of Onset    Hypertension Mother         Treated effectively    Heart disease Father         heart attack at age 52;  at age 68       Social History:    reports that she has never smoked. She has never used smokeless tobacco. She reports current alcohol use of about 5.0 standard drinks of alcohol per week. She reports that she does not use drugs.   SMOKING STATUS: Non-smoker    Surgical History:     Past Surgical History:   Procedure Laterality Date    SKIN CANCER EXCISION      Upper lip       Allergies:   Red dye, Diclofenac, Ibuprofen, and Sulfa antibiotics    Physical Exam:    Vital Signs:/86 (BP Location: Left arm, Patient Position: Sitting, Cuff Size: Adult)   Temp 96.2 øF (35.7 øC) (Infrared)   Ht 167.6 cm (66\")   Wt 52.6 kg (116 lb)   BMI 18.72 kg/mý    BMI: Body mass index is 18.72 kg/mý.     GENERAL:           The patient is in no acute distress, and is able to answer all questions appropriately.    Neck:          Supple without lymphadenopathy    Cardiovascular:       Peripheral pulses 2+ at dorsalis pedis and posterior tibialis    Lungs:         Breathing unlabored    Musculoskeletal:            strength is 5 out of 5 bilaterally.        Shoulder abduction is 5 out of 5.         Dorsiflexion is 5/5 Bilaterally       Plantarflexion is 5/5 bilaterally       Hip Flexion 5/5 bilaterally.         The patient's gait is normal without antalgia.    Neurologic:          The patient " is alert and oriented by 3.          Pupils are equal and reactive to light.         Visual fields are full.         Extraocular movements are intact without nystagmus.         There is no evidence of central motor drift. No facial droop.  No difficulty with rapid alternating movements.         Sensation is equal bilaterally with no deficit.           Reflexes:  2+ through out    CRANIAL NERVES:         Cranial nerve II: Visual fields are full to confrontation.       Cranial nerves III, IV and VI: PERRLA DC.  Extraocular movements are intact.  Nystagmus is not present.       Cranial nerve V: Facial sensation is intact to light touch.       Cranial nerve VII: Muscles of facial expression revealed no asymmetry.       Cranial nerve VIII: Hearing is intact to finger rub bilaterally.       Cranial nerve IX and X: Palate elevates symmetrically.        Cranial nerve XI: Shoulder shrug is intact.       Cranial nerve XII: Tongue is midline without evidence of Atrophy or fasciculation.    Medical Decision Making    Data Review:   Ultrasound duplex 10/9/2023:  Carotid Velocities - Right Side     Systolic Diastolic   CCA Prox 81.5 cm/sec       31.1 cm/sec         CCA Mid 92 cm/sec       35.2 cm/sec         CCA Dist 85.6 cm/sec       31.1 cm/sec         ICA Prox 72.1 cm/sec       31.1 cm/sec         ICA Mid 313 cm/sec       107 cm/sec         ICA Dist 105 cm/sec       36.9 cm/sec         ECA 76.2 cm/sec       18.1 cm/sec         Vertebral 109 cm/sec       34.6 cm/sec         Subclavian 105 cm/sec             ICA/CCA 3.68                Carotid Velocities - Left Side     Systolic Diastolic   CCA Prox 84.4 cm/sec       28.7 cm/sec         CCA Mid 93.2 cm/sec       32.2 cm/sec         CCA Dist 93.2 cm/sec       33.4 cm/sec         ICA Prox 66.8 cm/sec       34 cm/sec         ICA Mid 229 cm/sec       83 cm/sec         ICA Dist 132 cm/sec       53 cm/sec         ECA 72.7 cm/sec       14.1 cm/sec         Vertebral 82.1 cm/sec              Subclavian 112 cm/sec             ICA/CCA 2.46               Diagnosis:   FMD  Asymptomatic carotid artery stenosis    Treatment Options:   With fluctuation of the carotid velocities we will get an anatomic study at next visit.  We will do this in 6 months  Dr. Goodson has requested to see the patient back which we will make arrangements for.  At that time we will have her get a ultrasound of the carotids for comparison as well as an MRA of the head and neck with gadolinium.     In the meantime if she develops any signs or symptoms of ocular TIA sided weakness or stroke we will take a more aggressive approach.     Patient verbalized her understanding    BMI is within normal parameters. No other follow-up for BMI required.     Diagnosis Plan   1. Carotid stenosis, bilateral  Duplex Carotid Ultrasound CAR    MRI Angiogram Head With Contrast    MRI Angiogram Neck With Contrast    MRI Angiogram Head With & Without Contrast

## 2023-10-11 ENCOUNTER — TELEPHONE (OUTPATIENT)
Dept: NEUROSURGERY | Facility: CLINIC | Age: 73
End: 2023-10-11

## 2023-10-11 NOTE — TELEPHONE ENCOUNTER
Caller: PATIENT    Relationship: SELF    Best call back number: [phone]    What is the best time to reach you: ANYTIME    Who are you requesting to speak with (clinical staff, provider,  specific staff member): CLINICAL STAFF    Do you know the name of the person who called: NA    What was the call regarding: PATIENT WAS CALLING TO CHECK ON HER TESTING THAT NOE WAS PUTTING IN-SHE STATES THERE WAS AN ISSUE WHEN CHECKING OUT-PT IS JUST CALLING TO FOLLOW UP-PT IS ASKING FOR A CALL BACK TO ADVISE THANK YOU     Is it okay if the provider responds through MyChart: NO

## 2024-04-12 ENCOUNTER — HOSPITAL ENCOUNTER (OUTPATIENT)
Dept: MRI IMAGING | Facility: HOSPITAL | Age: 74
Discharge: HOME | End: 2024-04-12
Payer: MEDICARE

## 2024-04-12 ENCOUNTER — HOSPITAL ENCOUNTER (OUTPATIENT)
Dept: CARDIOLOGY | Facility: HOSPITAL | Age: 74
Discharge: HOME | End: 2024-04-12
Payer: MEDICARE

## 2024-04-12 VITALS — BODY MASS INDEX: 18.64 KG/M2 | HEIGHT: 66 IN | WEIGHT: 116 LBS

## 2024-04-12 DIAGNOSIS — I65.23 CAROTID STENOSIS, BILATERAL: ICD-10-CM

## 2024-04-12 LAB
BH CV XLRA MEAS LEFT DIST CCA EDV: 31 CM/SEC
BH CV XLRA MEAS LEFT DIST CCA PSV: 93.9 CM/SEC
BH CV XLRA MEAS LEFT DIST ICA EDV: 48.4 CM/SEC
BH CV XLRA MEAS LEFT DIST ICA PSV: 134 CM/SEC
BH CV XLRA MEAS LEFT ICA/CCA RATIO: 2.69
BH CV XLRA MEAS LEFT MID CCA EDV: 30 CM/SEC
BH CV XLRA MEAS LEFT MID CCA PSV: 88.1 CM/SEC
BH CV XLRA MEAS LEFT MID ICA EDV: 90.1 CM/SEC
BH CV XLRA MEAS LEFT MID ICA PSV: 237 CM/SEC
BH CV XLRA MEAS LEFT PROX CCA EDV: 29.1 CM/SEC
BH CV XLRA MEAS LEFT PROX CCA PSV: 93.9 CM/SEC
BH CV XLRA MEAS LEFT PROX ECA EDV: 22.3 CM/SEC
BH CV XLRA MEAS LEFT PROX ECA PSV: 81.3 CM/SEC
BH CV XLRA MEAS LEFT PROX ICA EDV: 53.5 CM/SEC
BH CV XLRA MEAS LEFT PROX ICA PSV: 137 CM/SEC
BH CV XLRA MEAS LEFT PROX SCLA PSV: 138 CM/SEC
BH CV XLRA MEAS LEFT VERTEBRAL A EDV: 24.2 CM/SEC
BH CV XLRA MEAS LEFT VERTEBRAL A PSV: 65.9 CM/SEC
BH CV XLRA MEAS RIGHT DIST CCA EDV: 32.9 CM/SEC
BH CV XLRA MEAS RIGHT DIST CCA PSV: 77.6 CM/SEC
BH CV XLRA MEAS RIGHT DIST ICA EDV: 65.9 CM/SEC
BH CV XLRA MEAS RIGHT DIST ICA PSV: 172 CM/SEC
BH CV XLRA MEAS RIGHT ICA/CCA RATIO: 4.16
BH CV XLRA MEAS RIGHT MID CCA EDV: 30.6 CM/SEC
BH CV XLRA MEAS RIGHT MID CCA PSV: 83.9 CM/SEC
BH CV XLRA MEAS RIGHT MID ICA EDV: 130 CM/SEC
BH CV XLRA MEAS RIGHT MID ICA PSV: 349 CM/SEC
BH CV XLRA MEAS RIGHT PROX CCA EDV: 27.4 CM/SEC
BH CV XLRA MEAS RIGHT PROX CCA PSV: 83.9 CM/SEC
BH CV XLRA MEAS RIGHT PROX ECA EDV: 12.6 CM/SEC
BH CV XLRA MEAS RIGHT PROX ECA PSV: 83.3 CM/SEC
BH CV XLRA MEAS RIGHT PROX ICA EDV: 47 CM/SEC
BH CV XLRA MEAS RIGHT PROX ICA PSV: 120 CM/SEC
BH CV XLRA MEAS RIGHT PROX SCLA PSV: 98 CM/SEC
BH CV XLRA MEAS RIGHT VERTEBRAL A EDV: 21.3 CM/SEC
BH CV XLRA MEAS RIGHT VERTEBRAL A PSV: 71.7 CM/SEC
LEFT ARM BP: NORMAL MMHG
RIGHT ARM BP: NORMAL MMHG

## 2024-04-12 PROCEDURE — 70546 MR ANGIOGRAPH HEAD W/O&W/DYE: CPT

## 2024-04-12 PROCEDURE — A9577 INJ MULTIHANCE: HCPCS | Performed by: PHYSICIAN ASSISTANT

## 2024-04-12 PROCEDURE — 0 GADOBENATE DIMEGLUMINE 529 MG/ML SOLUTION: Performed by: PHYSICIAN ASSISTANT

## 2024-04-12 PROCEDURE — 70548 MR ANGIOGRAPHY NECK W/DYE: CPT

## 2024-04-12 PROCEDURE — 93880 EXTRACRANIAL BILAT STUDY: CPT

## 2024-04-12 RX ADMIN — GADOBENATE DIMEGLUMINE 15 ML: 529 INJECTION, SOLUTION INTRAVENOUS at 13:05

## 2024-04-15 ENCOUNTER — OFFICE VISIT (OUTPATIENT)
Dept: NEUROSURGERY | Facility: CLINIC | Age: 74
End: 2024-04-15
Payer: MEDICARE

## 2024-04-15 VITALS
WEIGHT: 114.5 LBS | BODY MASS INDEX: 18.4 KG/M2 | SYSTOLIC BLOOD PRESSURE: 122 MMHG | HEIGHT: 66 IN | DIASTOLIC BLOOD PRESSURE: 72 MMHG | TEMPERATURE: 97.7 F

## 2024-04-15 DIAGNOSIS — I65.23 BILATERAL CAROTID ARTERY STENOSIS: Primary | ICD-10-CM

## 2024-04-15 PROCEDURE — 99214 OFFICE O/P EST MOD 30 MIN: CPT | Performed by: NEUROLOGICAL SURGERY

## 2024-04-15 NOTE — PROGRESS NOTES
NAME: CYNTHIA ANN BLOCH   DOS: 4/15/2024  : 1950  PCP: Emeterio Duran MD    Chief Complaint:    Chief Complaint   Patient presents with    Carotid Stenosis, bilateral        History of Present Illness:  73 y.o. female   I saw a 73-year-old female in neurosurgical follow-up she is well-known to me for history of suspected fibromuscular dysplasia.  She presented with elevated velocities has undergone a CTA as well as follow-up MRA today and ultrasound she denies symptoms she currently takes aspirin daily she is been a cardiologist and been evaluated just to ensure she did not have renal dysfunction    PMHX  Allergies:  Allergies   Allergen Reactions    Red Dye Hives    Diclofenac Palpitations    Ibuprofen Palpitations    Sulfa Antibiotics Rash     Medications    Current Outpatient Medications:     aspirin 81 MG EC tablet, Take 1 tablet by mouth Daily., Disp: , Rfl:     atorvastatin (LIPITOR) 20 MG tablet, 1 tablet Daily., Disp: , Rfl:     calcium carbonate (OS-KADIE) 600 MG tablet, Take 2 tablets by mouth Daily., Disp: , Rfl:     Cholecalciferol (VITAMIN D-3 PO), Take 1,000 mg by mouth Daily., Disp: , Rfl:     estradiol (ESTRACE) 0.1 MG/GM vaginal cream, Insert 1 g into the vagina 1 (One) Time Per Week., Disp: , Rfl:     Multiple Vitamins-Minerals (MULTIVITAMIN ADULT PO), Take  by mouth Daily., Disp: , Rfl:     omeprazole (priLOSEC) 40 MG capsule, 1 capsule., Disp: , Rfl:     Psyllium (METAMUCIL PO), Take  by mouth Daily., Disp: , Rfl:   Past Medical History:  Past Medical History:   Diagnosis Date    Abnormal ECG     Chronic Left Bundle Branch Block    Cancer     (Basal cell) --Removed from upper lip    Carotid artery occlusion 2019    Coronary artery disease     ?Carotid artery stenosis    MRSA (methicillin resistant Staphylococcus aureus) 2010    acquired after emergency appendectomy    Osteoporosis      Past Surgical History:  Past Surgical History:   Procedure Laterality Date    SKIN  CANCER EXCISION      Upper lip     Social Hx:  Social History     Tobacco Use    Smoking status: Never    Smokeless tobacco: Never   Vaping Use    Vaping status: Never Used   Substance Use Topics    Alcohol use: Yes     Alcohol/week: 5.0 standard drinks of alcohol     Types: 5 Glasses of wine per week     Comment: social    Drug use: No     Family Hx:  Family History   Problem Relation Age of Onset    Hypertension Mother         Treated effectively    Heart disease Father         heart attack at age 52;  at age 68     Review of Systems:        Review of Systems   Constitutional:  Negative for activity change, appetite change, chills, diaphoresis, fatigue, fever and unexpected weight change.   HENT:  Negative for congestion, dental problem, drooling, ear discharge, ear pain, facial swelling, hearing loss, mouth sores, nosebleeds, postnasal drip, rhinorrhea, sinus pressure, sinus pain, sneezing, sore throat, tinnitus, trouble swallowing and voice change.    Eyes:  Negative for photophobia, pain, discharge, redness, itching and visual disturbance.   Respiratory:  Negative for apnea, cough, choking, chest tightness, shortness of breath, wheezing and stridor.    Cardiovascular:  Negative for chest pain, palpitations and leg swelling.   Gastrointestinal:  Negative for abdominal distention, abdominal pain, anal bleeding, blood in stool, constipation, diarrhea, nausea, rectal pain and vomiting.   Endocrine: Negative for cold intolerance, heat intolerance, polydipsia, polyphagia and polyuria.   Genitourinary:  Negative for decreased urine volume, difficulty urinating, dyspareunia, dysuria, enuresis, flank pain, frequency, genital sores, hematuria, menstrual problem, pelvic pain, urgency, vaginal bleeding, vaginal discharge and vaginal pain.   Musculoskeletal:  Negative for arthralgias, back pain, gait problem, joint swelling, myalgias, neck pain and neck stiffness.   Skin:  Negative for color change, pallor, rash and  "wound.   Allergic/Immunologic: Negative for environmental allergies, food allergies and immunocompromised state.   Neurological:  Negative for dizziness, tremors, seizures, syncope, facial asymmetry, speech difficulty, weakness, light-headedness, numbness and headaches.   Hematological:  Negative for adenopathy. Does not bruise/bleed easily.   Psychiatric/Behavioral:  Negative for agitation, behavioral problems, confusion, decreased concentration, dysphoric mood, hallucinations, self-injury, sleep disturbance and suicidal ideas. The patient is not nervous/anxious and is not hyperactive.       I have reviewed this note template and all pertinent parts of the review of systems social, family history, surgical history and medication list      Physical Examination:  Vitals:    04/15/24 1011   BP: 122/72   Temp:       General Appearance:   Well developed, well nourished, well groomed, alert, and cooperative.  Neurological examination:  Neurologic Exam  She is wide-awake alert follows commands    Her cranial nerves appear grossly intact    She moves her arms and legs symmetrically she can ambulate without assistance    Speech is fluent with normal mentation she is accompanied by her  today    Review of Imaging/DATA:  CTA MRA full personally reviewed interpreted and I compare the old and new studies carotid ultrasound imaging also reviewed that demonstrates \"elevated \"velocities that are likely falsely increased her left internal carotid shows diminutive caliber consistent with fibromuscular dysplasia history overall however when comparing all the new studies the stability of the scans appears good  Diagnoses/Plan:    Ms. Bloch is a 73 y.o. female   1.  Fibromuscular dysplasia bilateral carotid incidental note is made of basilar fenestration as a variant of normal    2.  Currently without evidence of stroke or any neurologic findings    Plan from a neurosurgical standpoint will be clinical monitoring    Continuation " aspirin    Instructed about signs and symptoms of stroke and counseled about stroke risk factor lifestyle, and avoidance of dehydration etc. she is to continue aspirin    From my standpoint I think a follow-up ultrasound in 3 to 5 years would be appropriate she is to call us if there is any change     It has been a pleasure to provide neurosurgical care